# Patient Record
Sex: FEMALE | Race: WHITE | Employment: FULL TIME | URBAN - METROPOLITAN AREA
[De-identification: names, ages, dates, MRNs, and addresses within clinical notes are randomized per-mention and may not be internally consistent; named-entity substitution may affect disease eponyms.]

---

## 2018-01-18 ENCOUNTER — OFFICE VISIT (OUTPATIENT)
Dept: DERMATOLOGY | Facility: CLINIC | Age: 33
End: 2018-01-18
Payer: COMMERCIAL

## 2018-01-18 DIAGNOSIS — B07.8 COMMON WART: Primary | ICD-10-CM

## 2018-01-18 RX ORDER — BUPROPION HYDROCHLORIDE 150 MG/1
150 TABLET ORAL
COMMUNITY
Start: 2015-07-28

## 2018-01-18 RX ORDER — FLUOROURACIL 50 MG/G
CREAM TOPICAL AT BEDTIME
Qty: 40 G | Refills: 1 | Status: SHIPPED | OUTPATIENT
Start: 2018-01-18

## 2018-01-18 RX ORDER — CETIRIZINE HYDROCHLORIDE 10 MG/1
10 TABLET ORAL
COMMUNITY
Start: 2015-05-18

## 2018-01-18 ASSESSMENT — PAIN SCALES - GENERAL
PAINLEVEL: NO PAIN (1)
PAINLEVEL: NO PAIN (0)

## 2018-01-18 NOTE — NURSING NOTE
Dermatology Rooming Note    Lisa Fischer's goals for this visit include:   Chief Complaint   Patient presents with     Wart     Lisa is here today for a wart removal- on left ring finger, notes spreading.      Alize Herrera MA

## 2018-01-18 NOTE — PROGRESS NOTES
"[facility] Dermatology Note    Dermatology Problem List:  1. Verrucous vulgaris  - s/p cryo 1/18/18  - Efudex 5% cream    CC:   Chief Complaint   Patient presents with     Wart     Lisa is here today for a wart removal- on left ring finger, notes spreading.      Date of Service: Jan 18, 2018    History of Present Illness:  Ms. Lisa Fischer is a 32 year old female who presents for an evaluation of a wart as a new patient. Today the patient reports that she has a wart on her left ring finger. She states that the wart has been there for 1 year and it has spread to other areas. She notes that she has used a \"frozen pen\" application four times and also has used over the counter salicylic acid. Sometimes it has bled as well. Overall she is feeling well. The patient reports no other lesions of concern at this time.    Otherwise, the patient reports no painful, bleeding, nonhealing, or pruritic lesions, and denies new or changing moles.    Past Medical History:   There is no problem list on file for this patient.    History reviewed. No pertinent past medical history.  History reviewed. No pertinent surgical history.     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    Family History:  No family history of skin cancer, eczema, or psoriasis.    Medications:  Current Outpatient Prescriptions   Medication Sig Dispense Refill     cetirizine (ZYRTEC) 10 MG tablet Take 10 mg by mouth       buPROPion (WELLBUTRIN XL) 150 MG 24 hr tablet Take 150 mg by mouth       levonorgestrel (MIRENA) 20 MCG/24HR IUD 1 each by Intrauterine route       Allergies:  Allergies   Allergen Reactions     Dust Mite Extract      Other reaction(s): Other, see comments  Nasal congestion     Pollen Extract      Other reaction(s): RHINITIS  Other reaction(s): Other, see comments  Rhinitis "     Review of Systems:  - Skin: As above in HPI. No additional skin concerns.    Physical exam:  Vitals: There were no vitals taken for this visit.  GEN: This is a well developed, well-nourished female in no acute distress, in a pleasant mood.      SKIN: Focused examination of the bilateral hands was performed.  - 4 mm verrucous papule on the tip of left 4th digit   - 1 mm verrucous papule adjacent to previous lesion  - No other lesions of concern on areas examined.     Impression/Plan:  1. Verrucous vulgaris  - Discussion of treatment options including cryotherapy and topical medications.  - Start Efudex 5% cream - apply to affected areas daily. Discussion to avoid exposure to animals. Advised to wait until pain resolves before applying cream.  - Cryotherapy procedure note: After verbal consent and discussion of risks and benefits including but no limited to dyspigmentation/scar, blister, and pain, 2 was(were) treated with 1-2mm freeze border for 2 cycles with liquid nitrogen. Post cryotherapy instructions were provided.     Follow-up in 4 weeks, earlier for new or changing lesions.    Staff Involved:  Staff Only    Scribe Disclosure:   I, Paola Garcia, am serving as a scribe to document services personally performed by Luly eBtts PA-C, based on data collection and the provider's statements to me.    Provider Disclosure:   The documentation recorded by the scribe accurately reflects the services I personally performed and the decisions made by me.    All risks, benefits and alternatives were discussed with patient.  Patient is in agreement and understands the assessment and plan.  All questions were answered.  Sun Screen Education was given.   Return to Clinic in 1 month or sooner as needed.   Luly Betts PA-C   [facility]

## 2018-01-18 NOTE — LETTER
"1/18/2018     RE: Lisa Fischer  [address]  [place] [address]     Dear Colleague,    Thank you for referring your patient, Lisa Fischer, to the [facility] DERMATOLOGY at [facility]. Please see a copy of my visit note below.    [facility] Dermatology Note    Dermatology Problem List:  1. Verrucous vulgaris  - s/p cryo 1/18/18  - Efudex 5% cream    CC:   Chief Complaint   Patient presents with     Wart     Lisa is here today for a wart removal- on left ring finger, notes spreading.      Date of Service: Jan 18, 2018    History of Present Illness:  Ms. Lisa Fischer is a 32 year old female who presents for an evaluation of a wart as a new patient. Today the patient reports that she has a wart on her left ring finger. She states that the wart has been there for 1 year and it has spread to other areas. She notes that she has used a \"frozen pen\" application four times and also has used over the counter salicylic acid. Sometimes it has bled as well. Overall she is feeling well. The patient reports no other lesions of concern at this time.    Otherwise, the patient reports no painful, bleeding, nonhealing, or pruritic lesions, and denies new or changing moles.    Past Medical History:   There is no problem list on file for this patient.    History reviewed. No pertinent past medical history.  History reviewed. No pertinent surgical history.     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    Family History:  No family history of skin cancer, eczema, or psoriasis.    Medications:  Current Outpatient Prescriptions   Medication Sig Dispense Refill     cetirizine (ZYRTEC) 10 MG tablet Take 10 mg by mouth       buPROPion (WELLBUTRIN XL) 150 MG 24 hr tablet Take 150 mg by mouth       levonorgestrel (MIRENA) 20 " MCG/24HR IUD 1 each by Intrauterine route       Allergies:  Allergies   Allergen Reactions     Dust Mite Extract      Other reaction(s): Other, see comments  Nasal congestion     Pollen Extract      Other reaction(s): RHINITIS  Other reaction(s): Other, see comments  Rhinitis     Review of Systems:  - Skin: As above in HPI. No additional skin concerns.    Physical exam:  Vitals: There were no vitals taken for this visit.  GEN: This is a well developed, well-nourished female in no acute distress, in a pleasant mood.      SKIN: Focused examination of the bilateral hands was performed.  - 4 mm verrucous papule on the tip of left 4th digit   - 1 mm verrucous papule adjacent to previous lesion  - No other lesions of concern on areas examined.     Impression/Plan:  1. Verrucous vulgaris  - Discussion of treatment options including cryotherapy and topical medications.  - Start Efudex 5% cream - apply to affected areas daily. Discussion to avoid exposure to animals. Advised to wait until pain resolves before applying cream.  - Cryotherapy procedure note: After verbal consent and discussion of risks and benefits including but no limited to dyspigmentation/scar, blister, and pain, 2 was(were) treated with 1-2mm freeze border for 2 cycles with liquid nitrogen. Post cryotherapy instructions were provided.     Follow-up in 4 weeks, earlier for new or changing lesions.    Staff Involved:  Staff Only    Scribe Disclosure:   I, Paola Garcia, am serving as a scribe to document services personally performed by Luly Betts PA-C, based on data collection and the provider's statements to me.    Provider Disclosure:   The documentation recorded by the scribe accurately reflects the services I personally performed and the decisions made by me.    All risks, benefits and alternatives were discussed with patient.  Patient is in agreement and understands the assessment and plan.  All questions were answered.  Sun Screen Education was  given.   Return to Clinic in 1 month or sooner as needed.   Luly Betts PA-C   [facility]erm[facility]

## 2018-01-18 NOTE — MR AVS SNAPSHOT
After Visit Summary   1/18/2018    Lisa Fischer    MRN: [id number]           Patient Information     Date Of Birth          1985        Visit Information        Provider Department      1/18/2018 12:30 PM Luly Betts PA-C [facility] Dermatology        Today's Diagnoses     Common wart    -  1      Care Instructions    Cryotherapy    What is it?    Use of a very cold liquid, such as liquid nitrogen, to freeze and destroy abnormal skin cells that need to be removed    What should I expect?    Tenderness and redness    A small blister that might grow and fill with dark purple blood. There may be crusting.    More than one treatment may be needed if the lesions do not go away.    How do I care for the treated area?    Gently wash the area with your hands when bathing.    Use a thin layer of Vaseline to help with healing. You may use a Band-Aid.     The area should heal within 7-10 days and may leave behind a pink or lighter color.     Do not use an antibiotic or Neosporin ointment.     You may take acetaminophen (Tylenol) for pain.     Call your Doctor if you have:    Severe pain    Signs of infection (warmth, redness, cloudy yellow drainage, and or a bad smell)    Questions or concerns    Who should I call with questions?       [facility]: [phone]       [facility]: [phone]       For urgent needs outside of business hours call the [facility] at [phone]        and ask for the dermatology resident on call          Follow-ups after your visit        Follow-up notes from your care team     Return in about 4 weeks (around 2/15/2018).      Your next 10 appointments already scheduled     Feb 26, 2018  8:45 AM CST   (Arrive by 8:30 AM)   Return Visit with SHELL Winslow [facility] Dermatology ([facility])    [address] [place] [id number]    [phone]              Who to contact     Please call your clinic at [phone] to:    Ask questions about your health    Make or cancel appointments    Discuss your medicines    Learn about your test results    Speak to your doctor   If you have compliments or concerns about an experience at your clinic, or if you wish to file a complaint, please contact [facility] Patient Relations at [phone] or email us at [email or web address]         Additional Information About Your Visit        HipvanharGlad to Have You Information     Pockee is an electronic gateway that provides easy, online access to your medical records. With Pockee, you can request a clinic appointment, read your test results, renew a prescription or communicate with your care team.     To sign up for Pockee visit the website at [email or web address]   You will be asked to enter the access code listed below, as well as some personal information. Please follow the directions to create your username and password.     Your access code is: [email or web address]  Expires: 2018  9:00 AM     Your access code will  in 90 days. If you need help or a new code, please contact your [facility] Clinic or call [phone] for assistance.        Care EveryWhere ID     This is your Care EveryWhere ID. This could be used by other organizations to access your [place] medical records  [id number]         Blood Pressure from Last 3 Encounters:   No data found for BP    Weight from Last 3 Encounters:   No data found for Wt              Today, you had the following     No orders found for display         Today's Medication Changes          These changes are accurate as of: 18  1:07 PM.  If you have any questions, ask your nurse or doctor.               Start taking these medicines.        Dose/Directions    fluorouracil 5 % cream   Commonly known as:  EFUDEX   Used for:  Common wart   Started by:   Luly Betts PA-C        Apply topically At Bedtime To the wart   Quantity:  40 g   Refills:  1            Where to get your medicines      These medications were sent to [place], [place] - [address]  [address], [facility] [address]    Hours:  TRANSPLANT PHONE NUMBER [phone] Phone:  [phone]     fluorouracil 5 % cream                Primary Care Provider    None Specified       No primary provider on file.        Equal Access to Services     LAURA PRITCHARD : Hadii aad ku hadasho Soomaali, waaxda luqadaha, qaybta kaalmada adeegyada, waxay idiin haylillin gordon levin . So [facility] [phone].    ATENCIÓN: Si habla español, tiene a otero disposición servicios gratuitos de asistencia lingüística. Llame al [phone].    We comply with applicable federal civil rights laws and [place] laws. We do not discriminate on the basis of race, color, national origin, age, disability, sex, sexual orientation, or gender identity.            Thank you!     Thank you for choosing [facility] DERMATOLOGY  for your care. Our goal is always to provide you with excellent care. Hearing back from our patients is one way we can continue to improve our services. Please take a few minutes to complete the written survey that you may receive in the mail after your visit with us. Thank you!             Your Updated Medication List - Protect others around you: Learn how to safely use, store and throw away your medicines at www.disposemymeds.org.          This list is accurate as of: 1/18/18  1:07 PM.  Always use your most recent med list.                   Brand Name Dispense Instructions for use Diagnosis    buPROPion 150 MG 24 hr tablet    WELLBUTRIN XL     Take 150 mg by mouth        cetirizine 10 MG tablet    zyrTEC     Take 10 mg by mouth        fluorouracil 5 % cream    EFUDEX    40 g    Apply topically At Bedtime To the wart    Common wart        levonorgestrel 20 MCG/24HR IUD    MIRENA     1 each by Intrauterine route

## 2019-01-29 ENCOUNTER — OFFICE VISIT (OUTPATIENT)
Dept: DERMATOLOGY | Facility: CLINIC | Age: 34
End: 2019-01-29
Payer: COMMERCIAL

## 2019-01-29 DIAGNOSIS — D48.5 NEOPLASM OF UNCERTAIN BEHAVIOR OF SKIN: ICD-10-CM

## 2019-01-29 DIAGNOSIS — B07.9 VIRAL WARTS, UNSPECIFIED TYPE: Primary | ICD-10-CM

## 2019-01-29 RX ORDER — CANDIDA ALBICANS 1000 [PNU]/ML
0.1 INJECTION, SOLUTION INTRADERMAL ONCE
Status: COMPLETED | OUTPATIENT
Start: 2019-01-29 | End: 2019-01-29

## 2019-01-29 RX ADMIN — CANDIDA ALBICANS 0.1 ML: 1000 INJECTION, SOLUTION INTRADERMAL at 16:47

## 2019-01-29 ASSESSMENT — PAIN SCALES - GENERAL
PAINLEVEL: NO PAIN (0)
PAINLEVEL: MILD PAIN (2)

## 2019-01-29 NOTE — NURSING NOTE
Lidocaine-epinephrine 1-1:232668 % injection   .75mL once for one use, starting 1/29/2019 ending 1/29/2019,  2mL disp, R-0, injection  Injected by Sammie Underwood

## 2019-01-29 NOTE — NURSING NOTE
Dermatology Rooming Note    Lisa Fischer's goals for this visit include:   Chief Complaint   Patient presents with     Derm Problem     Lisa is being seen today for a wart from previous and 2 on face, 1 on left hand ring finger, right hand pointer finger, possibly Left foot as reported by pt.     Neli Mckee, NATASHA

## 2019-01-29 NOTE — LETTER
Date:January 30, 2019      Patient was self referred, no letter generated. Do not send.        [facility] Health Information

## 2019-01-29 NOTE — PROGRESS NOTES
[facility] Dermatology Note    Dermatology Problem List:  1. Verrucous vulgaris- otc salicylic acid   - s/p candida 1/29/19  - s/p cryo 1/18/18, 1/29/19  - s/p Efudex 5% cream  2. NUB, right lower cheek- s/p bx 1/29/19    CC:   Chief Complaint   Patient presents with     Derm Problem     Lisa is being seen today for a wart from previous and 2 on face, 1 on left hand ring finger, right hand pointer finger, possibly Left foot as reported by pt.     Date of Service: Jan 29, 2019    History of Present Illness:  Ms. Lisa Fischer is a 33 year old female who presents today in follow up for warts. The patient was last seen in the dermatology clinic on 1/18/18 during which she started efudex 5% cream and 2 verruca on her left 4th digit. Today she reports the original wart resolved after diligent use of the Efudex. However, the wart returned along with additional warts, which required clinical treatment. The lesion on her face grew very rapidly. The original wart on her left 4th digit has returned flatter than previously. She notes that many of her nails have dry patches of skin around them. She assumed that her cuticles are very dry due to the weather.     Otherwise, the patient reports no painful, bleeding, nonhealing, or pruritic lesions, and denies new or changing moles.    Past Medical History:   There is no problem list on file for this patient.    No past medical history on file.  No past surgical history on file.     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    Family History:  No family history of skin cancer, eczema, or psoriasis.    Medications:  Current Outpatient Medications   Medication Sig Dispense Refill     cetirizine (ZYRTEC) 10 MG tablet Take 10 mg by mouth       levonorgestrel (MIRENA) 20 MCG/24HR IUD 1 each by Intrauterine route        buPROPion (WELLBUTRIN XL) 150 MG 24 hr tablet Take 150 mg by mouth       fluorouracil (EFUDEX) 5 % cream Apply topically At Bedtime To the wart (Patient not taking: Reported on 1/29/2019) 40 g 1     Allergies:  Allergies   Allergen Reactions     Dust Mite Extract      Other reaction(s): Other, see comments  Nasal congestion     Pollen Extract      Other reaction(s): RHINITIS  Other reaction(s): Other, see comments  Rhinitis     Review of Systems:  - Skin: As above in HPI. No additional skin concerns.  - Constitutional: She is feeling generally well, in her usual state of health    Physical exam:  Vitals: There were no vitals taken for this visit.  GEN: This is a well developed, well-nourished female in no acute distress, in a pleasant mood.    SKIN: Focused examination of the face and bilateral hands and feet was performed.  - Large plaque at 2nd metatarsal area with an adjacent 3 mm wart.    - There are 3 additional warts on the metatarsals  - There are 2 verrucous papules on the palmar surface of the left third digit and  left thumb beena ungually  - 2 periungual warts on the right thumb   - 2 verrucous papules on the right index finger  - There is a verrucous papule on the left nasal ala   - 3 mm slightly scaly exophytic papule on the right lower cheek  - No other lesions of concern on areas examined.     Impression/Plan:  1. Verrucous vulgaris x 9- face, bilateral hands and feet   - Discussion of treatment options including cryotherapy and candida, as previous warts were recalcitrant to topicals  - After cleansing with alcohol, a total of 0.8 cc of candida antigen was injected into suitable lesions on bilateral feet x 2 and hands x 7.  The patient tolerated the procedure well.      - Cryotherapy procedure note: After verbal consent and discussion of risks and benefits including but no limited to dyspigmentation/scar, blister, and pain, 1 wart on her left nasal ala was treated with 1-2mm freeze border for 2 cycles  with liquid nitrogen. Post cryotherapy instructions were provided.   -Start otc salicylic acid treatments. Recommend paring down lesion prior to application to increase effectiveness of medication.     2. Neoplasm of uncertain behavior to the right lower cheek. Differential diagnosis to include: vv vs scc vs other   -Shave biopsy: After discussion of benefits and risks including but not limited to bleeding, infection, scar, incomplete removal, recurrence, and non-diagnostic biopsy, written consent and photographs were obtained. The area was cleaned with isopropyl alcohol. 0.5 mL of 1% lidocaine with epinephrine was injected to obtain adequate anesthesia of the lesion on the right lower cheek. A shave biopsy was performed. Hemostasis was achieved with aluminium chloride. Vaseline and a sterile dressing were applied. The patient tolerated the procedure and no complications were noted. The patient was provided with verbal and written post care instructions.     Follow-up in 6 weeks, earlier for new or changing lesions.    Staff Involved:  Scribe/Staff    Scribe Disclosure:   Cindy DUKES, am serving as a scribe to document services personally performed by Luly Betts PA-C, based on data collection and the provider's statements to me.    Provider Disclosure:   The documentation recorded by the scribe accurately reflects the services I personally performed and the decisions made by me.    All risks, benefits and alternatives were discussed with patient.  Patient is in agreement and understands the assessment and plan.  All questions were answered.  Sun Screen Education was given.   Return to Clinic in 6 weeks or sooner as needed.   Luly Betts PA-C   [facility] Dermatology Clinic

## 2019-01-29 NOTE — LETTER
1/29/2019       RE: Lisa Fischer  [address]  [place] [address]     Dear Colleague,    Thank you for referring your patient, Lisa Fischer, to the [facility] DERMATOLOGY at [facility]. Please see a copy of my visit note below.    [facility] Dermatology Note    Dermatology Problem List:  1. Verrucous vulgaris- otc salicylic acid   - s/p candida 1/29/19  - s/p cryo 1/18/18, 1/29/19  - s/p Efudex 5% cream  2. NUB, right lower cheek- s/p bx 1/29/19    CC:   Chief Complaint   Patient presents with     Derm Problem     Lisa is being seen today for a wart from previous and 2 on face, 1 on left hand ring finger, right hand pointer finger, possibly Left foot as reported by pt.     Date of Service: Jan 29, 2019    History of Present Illness:  Ms. Lisa Fischer is a 33 year old female who presents today in follow up for warts. The patient was last seen in the dermatology clinic on 1/18/18 during which she started efudex 5% cream and 2 verruca on her left 4th digit. Today she reports the original wart resolved after diligent use of the Efudex. However, the wart returned along with additional warts, which required clinical treatment. The lesion on her face grew very rapidly. The original wart on her left 4th digit has returned flatter than previously. She notes that many of her nails have dry patches of skin around them. She assumed that her cuticles are very dry due to the weather.     Otherwise, the patient reports no painful, bleeding, nonhealing, or pruritic lesions, and denies new or changing moles.    Past Medical History:   There is no problem list on file for this patient.    No past medical history on file.  No past surgical history on file.     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    Family  History:  No family history of skin cancer, eczema, or psoriasis.    Medications:  Current Outpatient Medications   Medication Sig Dispense Refill     cetirizine (ZYRTEC) 10 MG tablet Take 10 mg by mouth       levonorgestrel (MIRENA) 20 MCG/24HR IUD 1 each by Intrauterine route       buPROPion (WELLBUTRIN XL) 150 MG 24 hr tablet Take 150 mg by mouth       fluorouracil (EFUDEX) 5 % cream Apply topically At Bedtime To the wart (Patient not taking: Reported on 1/29/2019) 40 g 1     Allergies:  Allergies   Allergen Reactions     Dust Mite Extract      Other reaction(s): Other, see comments  Nasal congestion     Pollen Extract      Other reaction(s): RHINITIS  Other reaction(s): Other, see comments  Rhinitis     Review of Systems:  - Skin: As above in HPI. No additional skin concerns.  - Constitutional: She is feeling generally well, in her usual state of health    Physical exam:  Vitals: There were no vitals taken for this visit.  GEN: This is a well developed, well-nourished female in no acute distress, in a pleasant mood.    SKIN: Focused examination of the face and bilateral hands and feet was performed.  - Large plaque at 2nd metatarsal area with an adjacent 3 mm wart.    - There are 3 additional warts on the metatarsals  - There are 2 verrucous papules on the palmar surface of the left third digit and  left thumb beena ungually  - 2 periungual warts on the right thumb   - 2 verrucous papules on the right index finger  - There is a verrucous papule on the left nasal ala   - 3 mm slightly scaly exophytic papule on the right lower cheek  - No other lesions of concern on areas examined.     Impression/Plan:  1. Verrucous vulgaris x 9- face, bilateral hands and feet   - Discussion of treatment options including cryotherapy and candida, as previous warts were recalcitrant to topicals  - After cleansing with alcohol, a total of 0.8 cc of candida antigen was injected into suitable lesions on bilateral feet x 2 and hands x  7.  The patient tolerated the procedure well.      - Cryotherapy procedure note: After verbal consent and discussion of risks and benefits including but no limited to dyspigmentation/scar, blister, and pain, 1 wart on her left nasal ala was treated with 1-2mm freeze border for 2 cycles with liquid nitrogen. Post cryotherapy instructions were provided.   -Start otc salicylic acid treatments. Recommend paring down lesion prior to application to increase effectiveness of medication.     2. Neoplasm of uncertain behavior to the right lower cheek. Differential diagnosis to include: vv vs scc vs other   -Shave biopsy: After discussion of benefits and risks including but not limited to bleeding, infection, scar, incomplete removal, recurrence, and non-diagnostic biopsy, written consent and photographs were obtained. The area was cleaned with isopropyl alcohol. 0.5 mL of 1% lidocaine with epinephrine was injected to obtain adequate anesthesia of the lesion on the right lower cheek. A shave biopsy was performed. Hemostasis was achieved with aluminium chloride. Vaseline and a sterile dressing were applied. The patient tolerated the procedure and no complications were noted. The patient was provided with verbal and written post care instructions.     Follow-up in 6 weeks, earlier for new or changing lesions.    Staff Involved:  Scribe/Staff    Scribe Disclosure:   ERNST, Cindy Mcnulty, am serving as a scribe to document services personally performed by Luly Betts PA-C, based on data collection and the provider's statements to me.    Provider Disclosure:   The documentation recorded by the scribe accurately reflects the services I personally performed and the decisions made by me.    All risks, benefits and alternatives were discussed with patient.  Patient is in agreement and understands the assessment and plan.  All questions were answered.  Sun Screen Education was given.   Return to Clinic in 6 weeks or sooner as needed.    Luly Betts PA-C   [facility]     Again, thank you for allowing me to participate in the care of your patient.      Sincerely,    Luly Betts PA-C

## 2019-01-29 NOTE — PATIENT INSTRUCTIONS
Wound Care After a Biopsy    What is a skin biopsy?  A skin biopsy allows the doctor to examine a very small piece of tissue under the microscope to determine the diagnosis and the best treatment for the skin condition. A local anesthetic (numbing medicine)  is injected with a very small needle into the skin area to be tested. A small piece of skin is taken from the area. Sometimes a suture (stitch) is used.     What are the risks of a skin biopsy?  I will experience scar, bleeding, swelling, pain, crusting and redness. I may experience incomplete removal or recurrence. Risks of this procedure are excessive bleeding, bruising, infection, nerve damage, numbness, thick (hypertrophic or keloidal) scar and non-diagnostic biopsy.    How should I care for my wound for the first 24 hours?    Keep the wound dry and covered for 24 hours    If it bleeds, hold direct pressure on the area for 15 minutes. If bleeding does not stop then go to the emergency room    Avoid strenuous exercise the first 1-2 days or as your doctor instructs you    How should I care for the wound after 24 hours?    After 24 hours, remove the bandage    You may bathe or shower as normal    If you had a scalp biopsy, you can shampoo as usual and can use shower water to clean the biopsy site daily    Clean the wound twice a day with gentle soap and water    Do not scrub, be gentle    Apply white petroleum/Vaseline after cleaning the wound with a cotton swab or a clean finger, and keep the site covered with a Bandaid /bandage. Bandages are not necessary with a scalp biopsy    If you are unable to cover the site with a Bandaid /bandage, re-apply ointment 2-3 times a day to keep the site moist. Moisture will help with healing    Avoid strenuous activity for first 1-2 days    Avoid lakes, rivers, pools, and oceans until the stitches are removed or the site is healed    How do I clean my wound?    Wash hands thoroughly with soap or use hand  before all  wound care    Clean the wound with gentle soap and water    Apply white petroleum/Vaseline  to wound after it is clean    Replace the Bandaid /bandage to keep the wound covered for the first few days or as instructed by your doctor    If you had a scalp biopsy, warm shower water to the area on a daily basis should suffice    What should I use to clean my wound?     Cotton-tipped applicators (Qtips )    White petroleum jelly (Vaseline ). Use a clean new container and use Q-tips to apply.    Bandaids   as needed    Gentle soap     How should I care for my wound long term?    Do not get your wound dirty    Keep up with wound care for one week or until the area is healed.    A small scab will form and fall off by itself when the area is completely healed. The area will be red and will become pink in color as it heals. Sun protection is very important for how your scar will turn out. Sunscreen with an SPF 30 or greater is recommended once the area is healed.    You should have some soreness but it should be mild and slowly go away over several days. Talk to your doctor about using tylenol for pain,    When should I call my doctor?  If you have increased:     Pain or swelling    Pus or drainage (clear or slightly yellow drainage is ok)    Temperature over 100F    Spreading redness or warmth around wound    When will I hear about my results?  The biopsy results can take 2-3 weeks to come back. The clinic will call you with the results, send you a "[employer]"t message, or have you schedule a follow-up clinic or phone time to discuss the results. Contact our clinics if you do not hear from us in 3 weeks.     Who should I call with questions?    [facility]: [phone]     [facility]: [phone]    For urgent needs outside of business hours call the [facility] at [phone] and ask for the dermatology resident on call      Cryotherapy    What is it?    Use  of a very cold liquid, such as liquid nitrogen, to freeze and destroy abnormal skin cells that need to be removed    What should I expect?    Tenderness and redness    A small blister that might grow and fill with dark purple blood. There may be crusting.    More than one treatment may be needed if the lesions do not go away.    How do I care for the treated area?    Gently wash the area with your hands when bathing.    Use a thin layer of Vaseline to help with healing. You may use a Band-Aid.     The area should heal within 7-10 days and may leave behind a pink or lighter color.     Do not use an antibiotic or Neosporin ointment.     You may take acetaminophen (Tylenol) for pain.     Call your Doctor if you have:    Severe pain    Signs of infection (warmth, redness, cloudy yellow drainage, and or a bad smell)    Questions or concerns    Who should I call with questions?       [facility]: [phone]       [facility]: [phone]       For urgent needs outside of business hours call the [facility] at [phone]        and ask for the dermatology resident on call

## 2019-02-01 LAB — COPATH REPORT: NORMAL

## 2019-02-15 ENCOUNTER — HEALTH MAINTENANCE LETTER (OUTPATIENT)
Age: 34
End: 2019-02-15

## 2019-03-12 ENCOUNTER — OFFICE VISIT (OUTPATIENT)
Dept: DERMATOLOGY | Facility: CLINIC | Age: 34
End: 2019-03-12
Payer: COMMERCIAL

## 2019-03-12 ENCOUNTER — DOCUMENTATION ONLY (OUTPATIENT)
Dept: CARE COORDINATION | Facility: CLINIC | Age: 34
End: 2019-03-12

## 2019-03-12 DIAGNOSIS — L73.8 SENILE SEBACEOUS GLAND HYPERPLASIA: ICD-10-CM

## 2019-03-12 DIAGNOSIS — B07.8 COMMON WART: Primary | ICD-10-CM

## 2019-03-12 RX ORDER — CANDIDA ALBICANS 1000 [PNU]/ML
0.5 INJECTION, SOLUTION INTRADERMAL ONCE
Status: COMPLETED | OUTPATIENT
Start: 2019-03-12 | End: 2019-03-12

## 2019-03-12 RX ADMIN — CANDIDA ALBICANS 0.5 ML: 1000 INJECTION, SOLUTION INTRADERMAL at 16:10

## 2019-03-12 ASSESSMENT — PAIN SCALES - GENERAL: PAINLEVEL: NO PAIN (0)

## 2019-03-12 NOTE — LETTER
"3/12/2019       RE: Lisa Fischer  [address]  [facility] [place] [address]     Dear Colleague,    Thank you for referring your patient, Lisa Fischer, to the [facility] DERMATOLOGY at [facility]. Please see a copy of my visit note below.    [facility] Dermatology Note    Dermatology Problem List:  1. Verrucous vulgaris- otc salicylic acid   - s/p candida 1/29/19, 3/12/19  - s/p cryo 1/18/18, 1/29/19  - s/p Efudex 5% cream    CC:   Chief Complaint   Patient presents with     Wart     Lisa is here today to be seen for Warts. Patient states \"I have seen an improvement, but still present.\"     Date of Service: Mar 12, 2019    History of Present Illness:  Ms. Lisa Fischer is a 33 year old female who presents today in follow up for warts. The patient was last seen in the dermatology clinic on 01/29/19 during which 0.8 ml of candida was injected into 9 lesions on her bilateral feet and hands and 1 wart on her left nasal ala was treated with cryotherapy and 1 NUB on her right lower cheek was biopsied. This biopsy returned consistent with an inflamed verruca vulgaris.     Today she reports she has seen improvement in the warts on her feet and hands, but they are still present. As for the warts on her face, these have resolved. She has not used the otc salicylic acid treatments for the last few days, so the lesions could heal for today's treatments. She also reports a new lesion on her right eyebrow. Originally she thought this was a pimple, but this has persistent- making her think this lesion is a wart.     Otherwise, the patient reports no painful, bleeding, nonhealing, or pruritic lesions, and denies new or changing moles.    Past Medical History:   There is no problem list on file for this patient.    No past medical history on file.  No past surgical history on file.     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    Family History:  No family history of skin cancer, eczema, or psoriasis.    Medications:  Current Outpatient Medications   Medication Sig Dispense Refill     cetirizine (ZYRTEC) 10 MG tablet Take 10 mg by mouth       levonorgestrel (MIRENA) 20 MCG/24HR IUD 1 each by Intrauterine route       buPROPion (WELLBUTRIN XL) 150 MG 24 hr tablet Take 150 mg by mouth       fluorouracil (EFUDEX) 5 % cream Apply topically At Bedtime To the wart (Patient not taking: Reported on 1/29/2019) 40 g 1     Allergies:  Allergies   Allergen Reactions     Dust Mite Extract      Other reaction(s): Other, see comments  Nasal congestion     Pollen Extract      Other reaction(s): RHINITIS  Other reaction(s): Other, see comments  Rhinitis     Review of Systems:  - Skin: As above in HPI. No additional skin concerns.  - Constitutional: She is feeling generally well, in her usual state of health    Physical exam:  Vitals: There were no vitals taken for this visit.  GEN: This is a well developed, well-nourished female in no acute distress, in a pleasant mood.    SKIN: Focused examination of the face and bilateral hands and feet was performed.  - Scattered yellow oily papules with central umbilication on the right upper forehead   - On the left foot, there are 4 verrucous papules at the right 2nd metatarsal, thinner than previously  - There are 2 verrucous papules on the palmar surface of the left fourth digit and  left thumb beena ungually, thinner than previously  - 2 periungual warts on the right thumb, significantly thinner than previously   - 2 verrucous papules on the right index finger  - No verrucous papule on the left nasal ala   - No other lesions of concern on areas examined.     Impression/Plan:  1. Verrucous vulgaris x 9- face, bilateral hands and feet   - Discussion of treatment options including cryotherapy and candida, as previous  warts were recalcitrant to topicals  - After cleansing with alcohol, a total of 0.5 cc of candida antigen was injected into 5 suitable lesions on bilateral hands and feet.  The patient tolerated the procedure well.      -Continue otc salicylic acid treatments. Recommend paring down lesion prior to application to increase effectiveness of medication.     2. Sebaceous hyperplasia, right upper forehead  - Discussed etiology. Reassurance given.     Follow-up in 6 weeks, earlier for new or changing lesions.    Staff Involved:  Scribe/Staff    Scribe Disclosure:   I, Cindy Mcnulty, am serving as a scribe to document services personally performed by Luly Betts PA-C, based on data collection and the provider's statements to me.    Provider Disclosure:   The documentation recorded by the scribe accurately reflects the services I personally performed and the decisions made by me.    All risks, benefits and alternatives were discussed with patient.  Patient is in agreement and understands the assessment and plan.  All questions were answered.  Sun Screen Education was given.   Return to Clinic in 6 weeks or sooner as needed.   Luly Betts PA-C   [facility]     Again, thank you for allowing me to participate in the care of your patient.      Sincerely,    Luly Betts PA-C

## 2019-03-12 NOTE — PROGRESS NOTES
"[facility] Dermatology Note    Dermatology Problem List:  1. Verrucous vulgaris- otc salicylic acid   - s/p candida 1/29/19, 3/12/19  - s/p cryo 1/18/18, 1/29/19  - s/p Efudex 5% cream    CC:   Chief Complaint   Patient presents with     Wart     Lisa is here today to be seen for Warts. Patient states \"I have seen an improvement, but still present.\"     Date of Service: Mar 12, 2019    History of Present Illness:  Ms. Lisa Fischer is a 33 year old female who presents today in follow up for warts. The patient was last seen in the dermatology clinic on 01/29/19 during which 0.8 ml of candida was injected into 9 lesions on her bilateral feet and hands and 1 wart on her left nasal ala was treated with cryotherapy and 1 NUB on her right lower cheek was biopsied. This biopsy returned consistent with an inflamed verruca vulgaris.     Today she reports she has seen improvement in the warts on her feet and hands, but they are still present. As for the warts on her face, these have resolved. She has not used the otc salicylic acid treatments for the last few days, so the lesions could heal for today's treatments. She also reports a new lesion on her right eyebrow. Originally she thought this was a pimple, but this has persistent- making her think this lesion is a wart.     Otherwise, the patient reports no painful, bleeding, nonhealing, or pruritic lesions, and denies new or changing moles.    Past Medical History:   There is no problem list on file for this patient.    No past medical history on file.  No past surgical history on file.     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    Family History:  No family history of skin cancer, eczema, or psoriasis.    Medications:  Current Outpatient Medications   Medication Sig Dispense Refill     cetirizine " (ZYRTEC) 10 MG tablet Take 10 mg by mouth       levonorgestrel (MIRENA) 20 MCG/24HR IUD 1 each by Intrauterine route       buPROPion (WELLBUTRIN XL) 150 MG 24 hr tablet Take 150 mg by mouth       fluorouracil (EFUDEX) 5 % cream Apply topically At Bedtime To the wart (Patient not taking: Reported on 1/29/2019) 40 g 1     Allergies:  Allergies   Allergen Reactions     Dust Mite Extract      Other reaction(s): Other, see comments  Nasal congestion     Pollen Extract      Other reaction(s): RHINITIS  Other reaction(s): Other, see comments  Rhinitis     Review of Systems:  - Skin: As above in HPI. No additional skin concerns.  - Constitutional: She is feeling generally well, in her usual state of health    Physical exam:  Vitals: There were no vitals taken for this visit.  GEN: This is a well developed, well-nourished female in no acute distress, in a pleasant mood.    SKIN: Focused examination of the face and bilateral hands and feet was performed.  - Scattered yellow oily papules with central umbilication on the right upper forehead   - On the left foot, there are 4 verrucous papules at the right 2nd metatarsal, thinner than previously  - There are 2 verrucous papules on the palmar surface of the left fourth digit and  left thumb beena ungually, thinner than previously  - 2 periungual warts on the right thumb, significantly thinner than previously   - 2 verrucous papules on the right index finger  - No verrucous papule on the left nasal ala   - No other lesions of concern on areas examined.     Impression/Plan:  1. Verrucous vulgaris x 9- face, bilateral hands and feet   - Discussion of treatment options including cryotherapy and candida, as previous warts were recalcitrant to topicals  - After cleansing with alcohol, a total of 0.5 cc of candida antigen was injected into 5 suitable lesions on bilateral hands and feet.  The patient tolerated the procedure well.      -Continue otc salicylic acid treatments. Recommend  paring down lesion prior to application to increase effectiveness of medication.     2. Sebaceous hyperplasia, right upper forehead  - Discussed etiology. Reassurance given.     Follow-up in 6 weeks, earlier for new or changing lesions.    Staff Involved:  Scribe/Staff    Scribe Disclosure:   I, Cindy Mcnulty, am serving as a scribe to document services personally performed by Luly Betts PA-C, based on data collection and the provider's statements to me.    Provider Disclosure:   The documentation recorded by the scribe accurately reflects the services I personally performed and the decisions made by me.    All risks, benefits and alternatives were discussed with patient.  Patient is in agreement and understands the assessment and plan.  All questions were answered.  Sun Screen Education was given.   Return to Clinic in 6 weeks or sooner as needed.   Luly Betts PA-C   [facility]

## 2019-03-12 NOTE — LETTER
Date:March 14, 2019      Patient was self referred, no letter generated. Do not send.        [facility] Health Information

## 2019-03-12 NOTE — NURSING NOTE
"Chief Complaint   Patient presents with     Wart     Lisa is here today to be seen for Warts. Patient states \"I have seen an improvement, but still present.\"     Perlita Heaton LPN    "

## 2019-03-12 NOTE — NURSING NOTE
Drug Administration Record    Prior to injection, verified patient identity using patient's name and date of birth.  Due to injection administration, patient instructed to remain in clinic for 15 minutes  afterwards, and to report any adverse reaction to me immediately.    Drug Name: triamcinolone acetonide(kenalog)  Dose: .5 mL of candida antigen  Route administered: ID  NDC #: [id number]: Candida ([id number])  Amount of waste(mL):.5  Reason for waste: Multi dose vial     LOT #:   SITE: see provider note  : [employer]  EXPIRATION DATE: [id number]

## 2019-03-22 ENCOUNTER — MYC MEDICAL ADVICE (OUTPATIENT)
Dept: DERMATOLOGY | Facility: CLINIC | Age: 34
End: 2019-03-22

## 2019-03-22 DIAGNOSIS — L70.0 ACNE VULGARIS: Primary | ICD-10-CM

## 2019-03-25 RX ORDER — TRETINOIN 0.4 MG/G
GEL TOPICAL
Qty: 50 G | Refills: 3 | Status: SHIPPED | OUTPATIENT
Start: 2019-03-25

## 2019-05-01 ENCOUNTER — OFFICE VISIT (OUTPATIENT)
Dept: DERMATOLOGY | Facility: CLINIC | Age: 34
End: 2019-05-01
Payer: COMMERCIAL

## 2019-05-01 DIAGNOSIS — B07.8 COMMON WART: Primary | ICD-10-CM

## 2019-05-01 RX ORDER — CANDIDA ALBICANS 1000 [PNU]/ML
0.5 INJECTION, SOLUTION INTRADERMAL ONCE
Status: COMPLETED | OUTPATIENT
Start: 2019-05-01 | End: 2019-05-01

## 2019-05-01 RX ORDER — CIMETIDINE 800 MG
800 TABLET ORAL AT BEDTIME
Qty: 30 TABLET | Refills: 3 | Status: SHIPPED | OUTPATIENT
Start: 2019-05-01

## 2019-05-01 RX ADMIN — CANDIDA ALBICANS 0.5 ML: 1000 INJECTION, SOLUTION INTRADERMAL at 10:12

## 2019-05-01 ASSESSMENT — PAIN SCALES - GENERAL: PAINLEVEL: NO PAIN (0)

## 2019-05-01 NOTE — LETTER
"5/1/2019       RE: Lisa Fischer  [address]  [facility] [address]     Dear Colleague,    Thank you for referring your patient, Lisa Fischer, to the [facility] DERMATOLOGY at [facility]. Please see a copy of my visit note below.    [facility] Dermatology Note    Dermatology Problem List:  1. Verrucous vulgaris- otc salicylic acid   - s/p candida 1/29/19, 3/12/19  - s/p cryo 1/18/18, 1/29/19  - s/p Efudex 5% cream    CC:   Chief Complaint   Patient presents with     Derm Problem     Wart follow up, Lisa states \" My warts are the same.\"      Date of Service: May 1, 2019    History of Present Illness:  Ms. Lisa Fischer is a 33 year old female who presents today in follow up for warts. The patient was last seen in the dermatology clinic on 03/12/19 during which 0.5cc of candida antigen was injected into 5 warts on bilateral hands and feet.     Today she reports her warts are still present on her hands and feet. She is unsure if the warts have changed in texture or if they have gotten flatter, as she has a lot of dry skin on her hands. She has been using otc salicylic acid in between clinical treatments.     Otherwise, the patient reports no painful, bleeding, nonhealing, or pruritic lesions, and denies new or changing moles.    Past Medical History:   There is no problem list on file for this patient.    No past medical history on file.  No past surgical history on file.     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    Family History:  No family history of skin cancer, eczema, or psoriasis.    Medications:  Current Outpatient Medications   Medication Sig Dispense Refill     buPROPion (WELLBUTRIN XL) 150 MG 24 hr tablet Take 150 mg by mouth       cetirizine (ZYRTEC) 10 MG tablet Take 10 mg by mouth       " fluorouracil (EFUDEX) 5 % cream Apply topically At Bedtime To the wart (Patient not taking: Reported on 1/29/2019) 40 g 1     levonorgestrel (MIRENA) 20 MCG/24HR IUD 1 each by Intrauterine route       tretinoin microsphere (RETIN-A MICRO PUMP) 0.04 % external gel Apply a peasized amount to the face at bedtime. 50 g 3     Allergies:  Allergies   Allergen Reactions     Dust Mite Extract      Other reaction(s): Other, see comments  Nasal congestion     Pollen Extract      Other reaction(s): RHINITIS  Other reaction(s): Other, see comments  Rhinitis     Review of Systems:  - Skin: As above in HPI. No additional skin concerns.  - Constitutional: She is feeling generally well, in her usual state of health    Physical exam:  Vitals: There were no vitals taken for this visit.  GEN: This is a well developed, well-nourished female in no acute distress, in a pleasant mood.    SKIN: Focused examination of the face and bilateral hands and feet was performed.  - On the left foot, there are 4 verrucous papules at the right 2nd metatarsal, thinner than previously  - There are 2 verrucous papules on the palmar surface of the left fourth digit and  left thumb beena ungually, thinner than previously  - 2 periungual warts on the right thumb, significantly thinner than previously   - 2 verrucous papules on the right index finger  - No other lesions of concern on areas examined.     Impression/Plan:  1. Verrucous vulgaris x 9, bilateral hands and feet, s/p cryotherapy, candida, efudex 5% cream  - After cleansing with alcohol, a total of 0.5 cc of candida antigen was injected into 5 suitable lesions on bilateral hands and left foot.  The patient tolerated the procedure well.      -Continue otc salicylic acid treatments. Recommend paring down lesion prior to application to increase effectiveness of medication.  - Discussed risks and benefits of cimetidine. Pt is interested this in adding this to her daily regimen. Start cimetidine 800 mg at  bedtime.   - Discussed implementing daily zinc supplement   - Continue daily paring of lesions along with otc salicylic acid treatments       Follow-up in 6 weeks, earlier for new or changing lesions.    Staff Involved:  Scribe/Staff    Scribe Disclosure:   I, Cindy Mcnulty, am serving as a scribe to document services personally performed by Luly Betts PA-C, based on data collection and the provider's statements to me.    Provider Disclosure:   The documentation recorded by the scribe accurately reflects the services I personally performed and the decisions made by me.    All risks, benefits and alternatives were discussed with patient.  Patient is in agreement and understands the assessment and plan.  All questions were answered.  Sun Screen Education was given.   Return to Clinic in 6 weeks or sooner as needed.   Luly Betts PA-C   [facility]     Drug Administration Record    Prior to injection, verified patient identity using patient's name and date of birth.  Due to injection administration, patient instructed to remain in clinic for 15 minutes  afterwards, and to report any adverse reaction to me immediately.    Drug Name: candida antigen  Dose: 0.5mL of candida antigen  Route administered: ID  NDC #: [id number]: Candida (20730-615-32)  Amount of waste(mL):0.5  Reason for waste: Multi dose vial used as single use    LOT #:   SITE: see note  :[employer]  EXPIRATION DATE: 1/23/2021      Again, thank you for allowing me to participate in the care of your patient.      Sincerely,    Luly Betts PA-C

## 2019-05-01 NOTE — NURSING NOTE
"Dermatology Rooming Note    Lisa Fischer's goals for this visit include:   Chief Complaint   Patient presents with     Derm Problem     Wart follow up, Lisa states \" My warts are the same.\"      Sammie Underwood LPN  "

## 2019-05-01 NOTE — LETTER
Date:May 2, 2019      Patient was self referred, no letter generated. Do not send.        [facility] Health Information

## 2019-05-01 NOTE — PROGRESS NOTES
"[facility] Dermatology Note    Dermatology Problem List:  1. Verrucous vulgaris- otc salicylic acid   - s/p candida 1/29/19, 3/12/19  - s/p cryo 1/18/18, 1/29/19  - s/p Efudex 5% cream    CC:   Chief Complaint   Patient presents with     Derm Problem     Wart follow up, Lisa states \" My warts are the same.\"      Date of Service: May 1, 2019    History of Present Illness:  Ms. Lisa Fischer is a 33 year old female who presents today in follow up for warts. The patient was last seen in the dermatology clinic on 03/12/19 during which 0.5cc of candida antigen was injected into 5 warts on bilateral hands and feet.     Today she reports her warts are still present on her hands and feet. She is unsure if the warts have changed in texture or if they have gotten flatter, as she has a lot of dry skin on her hands. She has been using otc salicylic acid in between clinical treatments.     Otherwise, the patient reports no painful, bleeding, nonhealing, or pruritic lesions, and denies new or changing moles.    Past Medical History:   There is no problem list on file for this patient.    No past medical history on file.  No past surgical history on file.     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 She has been working there for 2 years.  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    Family History:  No family history of skin cancer, eczema, or psoriasis.    Medications:  Current Outpatient Medications   Medication Sig Dispense Refill     buPROPion (WELLBUTRIN XL) 150 MG 24 hr tablet Take 150 mg by mouth       cetirizine (ZYRTEC) 10 MG tablet Take 10 mg by mouth       fluorouracil (EFUDEX) 5 % cream Apply topically At Bedtime To the wart (Patient not taking: Reported on 1/29/2019) 40 g 1     levonorgestrel (MIRENA) 20 MCG/24HR IUD 1 each by Intrauterine route       tretinoin microsphere (RETIN-A MICRO PUMP) 0.04 % external gel Apply a peasized " amount to the face at bedtime. 50 g 3     Allergies:  Allergies   Allergen Reactions     Dust Mite Extract      Other reaction(s): Other, see comments  Nasal congestion     Pollen Extract      Other reaction(s): RHINITIS  Other reaction(s): Other, see comments  Rhinitis     Review of Systems:  - Skin: As above in HPI. No additional skin concerns.  - Constitutional: She is feeling generally well, in her usual state of health    Physical exam:  Vitals: There were no vitals taken for this visit.  GEN: This is a well developed, well-nourished female in no acute distress, in a pleasant mood.    SKIN: Focused examination of the face and bilateral hands and feet was performed.  - On the left foot, there are 4 verrucous papules at the right 2nd metatarsal, thinner than previously  - There are 2 verrucous papules on the palmar surface of the left fourth digit and  left thumb beena ungually, thinner than previously  - 2 periungual warts on the right thumb, significantly thinner than previously   - 2 verrucous papules on the right index finger  - No other lesions of concern on areas examined.     Impression/Plan:  1. Verrucous vulgaris x 9, bilateral hands and feet, s/p cryotherapy, candida, efudex 5% cream  - After cleansing with alcohol, a total of 0.5 cc of candida antigen was injected into 5 suitable lesions on bilateral hands and left foot.  The patient tolerated the procedure well.      -Continue otc salicylic acid treatments. Recommend paring down lesion prior to application to increase effectiveness of medication.  - Discussed risks and benefits of cimetidine. Pt is interested this in adding this to her daily regimen. Start cimetidine 800 mg at bedtime.   - Discussed implementing daily zinc supplement   - Continue daily paring of lesions along with otc salicylic acid treatments       Follow-up in 6 weeks, earlier for new or changing lesions.    Staff Involved:  Scribe/Staff    Scribe Disclosure:   Cindy DUKES, am  serving as a scribe to document services personally performed by Luly Betts PA-C, based on data collection and the provider's statements to me.    Provider Disclosure:   The documentation recorded by the scribe accurately reflects the services I personally performed and the decisions made by me.    All risks, benefits and alternatives were discussed with patient.  Patient is in agreement and understands the assessment and plan.  All questions were answered.  Sun Screen Education was given.   Return to Clinic in 6 weeks or sooner as needed.   Luly Betts PA-C   [facility]

## 2019-06-21 ENCOUNTER — OFFICE VISIT (OUTPATIENT)
Dept: DERMATOLOGY | Facility: CLINIC | Age: 34
End: 2019-06-21
Payer: COMMERCIAL

## 2019-06-21 DIAGNOSIS — L81.1 MELASMA: ICD-10-CM

## 2019-06-21 DIAGNOSIS — B07.8 COMMON WART: Primary | ICD-10-CM

## 2019-06-21 ASSESSMENT — PAIN SCALES - GENERAL: PAINLEVEL: NO PAIN (0)

## 2019-06-21 NOTE — NURSING NOTE
Chief Complaint   Patient presents with     RECHECK     Lisa is here today for a 6 week follow up on warts. She states that they got better, but are still present.      Urszula Jin, CMA

## 2019-06-21 NOTE — PROGRESS NOTES
[facility] Dermatology Note    Dermatology Problem List:  1. Verrucous vulgaris- otc salicylic acid   - s/p candida 1/29/19, 3/12/19, 5/1/19  - s/p cryo 1/18/18, 1/29/19  - s/p Efudex 5% cream  2. Acne vulgaris- Retin A micro 0.04% gel  3. Melasma, Retin A micro 0.04% gel and sun protection     CC:   Chief Complaint   Patient presents with     RECHECK     Lisa is here today for a 6 week follow up on warts. She states that they got better, but are still present.      Date of Service: Jun 21, 2019    History of Present Illness:  Ms. Lisa Fischer is a 33 year old female who presents today in follow up for warts. The patient was last seen in the dermatology clinic on 5/1/19 when 0.5 ml of candida antigen was injected into five warts on both hands and left foot. Today she reports that the warts have improved since the last visit, however are still present. She feels like capillaries in the warts appear more visible since starting the cimetidine. She would like to avoid having these injected again. She additionally reports some discoloration on her forehead. The patient is otherwise feeling well. There are no other skin concerns at this time      Past Medical History:   There is no problem list on file for this patient.    History reviewed. No pertinent past medical history.  History reviewed. No pertinent surgical history.     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    Family History:  No family history of skin cancer, eczema, or psoriasis.    Medications:  Current Outpatient Medications   Medication Sig Dispense Refill     buPROPion (WELLBUTRIN XL) 150 MG 24 hr tablet Take 150 mg by mouth       cetirizine (ZYRTEC) 10 MG tablet Take 10 mg by mouth       cimetidine (TAGAMET) 800 MG tablet Take 1 tablet (800 mg) by mouth At Bedtime 30 tablet 3      levonorgestrel (MIRENA) 20 MCG/24HR IUD 1 each by Intrauterine route       tretinoin microsphere (RETIN-A MICRO PUMP) 0.04 % external gel Apply a peasized amount to the face at bedtime. 50 g 3     fluorouracil (EFUDEX) 5 % cream Apply topically At Bedtime To the wart (Patient not taking: Reported on 1/29/2019) 40 g 1     Allergies:  Allergies   Allergen Reactions     Dust Mite Extract      Other reaction(s): Other, see comments  Nasal congestion     Pollen Extract      Other reaction(s): RHINITIS  Other reaction(s): Other, see comments  Rhinitis     Review of Systems:  - Skin: As above in HPI. No additional skin concerns.  - Constitutional: She is feeling generally well, in her usual state of health    Physical exam:  Vitals: There were no vitals taken for this visit.  GEN: This is a well developed, well-nourished female in no acute distress, in a pleasant mood.    SKIN: Focused examination of the face and bilateral hands and feet was performed.  - On the left foot, there are 4 verrucous papules at the right 2nd metatarsal, thinner than previously  - 2 verrucous papules on the palmar surface of the left fourth digit and  left thumb beena ungually, thinner than previously  - 2 periungual warts on the right thumb, significantly thinner than previously   - 2 verrucous papules on the right index finger, significantly thinner than previously.  - Brown reticular symmetric patches on the forehead and bilateral cheeks.   - No other lesions of concern on areas examined.     Impression/Plan:  1. Verrucous vulgaris x 9, bilateral hands and feet, s/p cryotherapy, candida, efudex 5% cream       Continue cimetidine 800 mg at bedtime and zinc supplementation.     Continue daily paring of lesions along with otc salicylic acid treatments.     Pt will consider restarting the Efudex cream as she did not use this diligently in the past.      2. Melasma     Discussed importance of sun protection to avoid darkening.     Reviewed barrier  sunscreens with zinc oxide or titanium dioxide.     Continue tretinoin 0.04% gel - apply to face at bedtime.     Follow-up in 2-3 months, earlier for new or changing lesions.      Staff Involved:  Staff Only    Scribe Disclosure:  I, Nando Jules, am serving as a scribe to document services personally performed by Luly Betts PA-C, based on data collection and the provider's statements to me.     Provider Disclosure:   The documentation recorded by the scribe accurately reflects the services I personally performed and the decisions made by me.    All risks, benefits and alternatives were discussed with patient.  Patient is in agreement and understands the assessment and plan.  All questions were answered.  Sun Screen Education was given.   Return to Clinic in 3 months or sooner as needed.   Luly Betts PA-C   [facility]

## 2019-06-21 NOTE — LETTER
6/21/2019       RE: Lisa Fischer  [address]  [facility] [address]     Dear Colleague,    Thank you for referring your patient, Lisa Fischer, to the [facility] DERMATOLOGY at [facility]. Please see a copy of my visit note below.    [facility] Dermatology Note    Dermatology Problem List:  1. Verrucous vulgaris- otc salicylic acid   - s/p candida 1/29/19, 3/12/19, 5/1/19  - s/p cryo 1/18/18, 1/29/19  - s/p Efudex 5% cream  2. Acne vulgaris- Retin A micro 0.04% gel  3. Melasma, Retin A micro 0.04% gel and sun protection     CC:   Chief Complaint   Patient presents with     RECHECK     Lisa is here today for a 6 week follow up on warts. She states that they got better, but are still present.      Date of Service: Jun 21, 2019    History of Present Illness:  Ms. Lisa Fischer is a 33 year old female who presents today in follow up for warts. The patient was last seen in the dermatology clinic on 5/1/19 when 0.5 ml of candida antigen was injected into five warts on both hands and left foot. Today she reports that the warts have improved since the last visit, however are still present. She feels like capillaries in the warts appear more visible since starting the cimetidine. She would like to avoid having these injected again. She additionally reports some discoloration on her forehead. The patient is otherwise feeling well. There are no other skin concerns at this time      Past Medical History:   There is no problem list on file for this patient.    History reviewed. No pertinent past medical history.  History reviewed. No pertinent surgical history.     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    Family History:  No family history of skin cancer, eczema, or psoriasis.    Medications:  Current Outpatient  Medications   Medication Sig Dispense Refill     buPROPion (WELLBUTRIN XL) 150 MG 24 hr tablet Take 150 mg by mouth       cetirizine (ZYRTEC) 10 MG tablet Take 10 mg by mouth       cimetidine (TAGAMET) 800 MG tablet Take 1 tablet (800 mg) by mouth At Bedtime 30 tablet 3     levonorgestrel (MIRENA) 20 MCG/24HR IUD 1 each by Intrauterine route       tretinoin microsphere (RETIN-A MICRO PUMP) 0.04 % external gel Apply a peasized amount to the face at bedtime. 50 g 3     fluorouracil (EFUDEX) 5 % cream Apply topically At Bedtime To the wart (Patient not taking: Reported on 1/29/2019) 40 g 1     Allergies:  Allergies   Allergen Reactions     Dust Mite Extract      Other reaction(s): Other, see comments  Nasal congestion     Pollen Extract      Other reaction(s): RHINITIS  Other reaction(s): Other, see comments  Rhinitis     Review of Systems:  - Skin: As above in HPI. No additional skin concerns.  - Constitutional: She is feeling generally well, in her usual state of health    Physical exam:  Vitals: There were no vitals taken for this visit.  GEN: This is a well developed, well-nourished female in no acute distress, in a pleasant mood.    SKIN: Focused examination of the face and bilateral hands and feet was performed.  - On the left foot, there are 4 verrucous papules at the right 2nd metatarsal, thinner than previously  - 2 verrucous papules on the palmar surface of the left fourth digit and  left thumb beena ungually, thinner than previously  - 2 periungual warts on the right thumb, significantly thinner than previously   - 2 verrucous papules on the right index finger, significantly thinner than previously.  - Brown reticular symmetric patches on the forehead and bilateral cheeks.   - No other lesions of concern on areas examined.     Impression/Plan:  1. Verrucous vulgaris x 9, bilateral hands and feet, s/p cryotherapy, candida, efudex 5% cream       Continue cimetidine 800 mg at bedtime and zinc supplementation.      Continue daily paring of lesions along with otc salicylic acid treatments.     Pt will consider restarting the Efudex cream as she did not use this diligently in the past.      2. Melasma     Discussed importance of sun protection to avoid darkening.     Reviewed barrier sunscreens with zinc oxide or titanium dioxide.     Continue tretinoin 0.04% gel - apply to face at bedtime.     Follow-up in 2-3 months, earlier for new or changing lesions.      Staff Involved:  Staff Only    Scribe Disclosure:  I, Nando Jules, am serving as a scribe to document services personally performed by Luly Betts PA-C, based on data collection and the provider's statements to me.     Provider Disclosure:   The documentation recorded by the scribe accurately reflects the services I personally performed and the decisions made by me.    All risks, benefits and alternatives were discussed with patient.  Patient is in agreement and understands the assessment and plan.  All questions were answered.  Sun Screen Education was given.   Return to Clinic in 3 months or sooner as needed.   Luly Betts PA-C   [facility]          Again, thank you for allowing me to participate in the care of your patient.      Sincerely,    Luly Betts PA-C

## 2019-06-21 NOTE — LETTER
Date:June 25, 2019      Patient was self referred, no letter generated. Do not send.        [facility] Health Information

## 2020-03-10 ENCOUNTER — HEALTH MAINTENANCE LETTER (OUTPATIENT)
Age: 35
End: 2020-03-10

## 2020-06-24 ENCOUNTER — HOSPITAL ENCOUNTER (EMERGENCY)
Facility: CLINIC | Age: 35
Discharge: HOME | End: 2020-06-24
Attending: EMERGENCY MEDICINE | Admitting: EMERGENCY MEDICINE
Payer: COMMERCIAL

## 2020-06-24 ENCOUNTER — APPOINTMENT (OUTPATIENT)
Dept: CT IMAGING | Facility: CLINIC | Age: 35
End: 2020-06-24
Attending: EMERGENCY MEDICINE
Payer: COMMERCIAL

## 2020-06-24 VITALS
OXYGEN SATURATION: 94 % | HEIGHT: 62 IN | DIASTOLIC BLOOD PRESSURE: 72 MMHG | HEART RATE: 54 BPM | SYSTOLIC BLOOD PRESSURE: 110 MMHG | RESPIRATION RATE: 16 BRPM | TEMPERATURE: 98.4 F

## 2020-06-24 DIAGNOSIS — R10.31 ABDOMINAL PAIN, RIGHT LOWER QUADRANT: ICD-10-CM

## 2020-06-24 LAB
ALBUMIN SERPL-MCNC: 4.5 G/DL (ref 3.4–5)
ALBUMIN UR-MCNC: NEGATIVE MG/DL
ALP SERPL-CCNC: 55 U/L (ref 40–150)
ALT SERPL W P-5'-P-CCNC: 17 U/L (ref 0–50)
ANION GAP SERPL CALCULATED.3IONS-SCNC: 3 MMOL/L (ref 3–14)
APPEARANCE UR: CLEAR
AST SERPL W P-5'-P-CCNC: 17 U/L (ref 0–45)
BASOPHILS # BLD AUTO: 0 10E9/L (ref 0–0.2)
BASOPHILS NFR BLD AUTO: 0.3 %
BILIRUB SERPL-MCNC: 0.6 MG/DL (ref 0.2–1.3)
BILIRUB UR QL STRIP: NEGATIVE
BUN SERPL-MCNC: 9 MG/DL (ref 7–30)
CALCIUM SERPL-MCNC: 9.3 MG/DL (ref 8.5–10.1)
CHLORIDE SERPL-SCNC: 106 MMOL/L (ref 94–109)
CO2 SERPL-SCNC: 31 MMOL/L (ref 20–32)
COLOR UR AUTO: YELLOW
CREAT SERPL-MCNC: 0.72 MG/DL (ref 0.52–1.04)
DIFFERENTIAL METHOD BLD: NORMAL
EOSINOPHIL # BLD AUTO: 0.2 10E9/L (ref 0–0.7)
EOSINOPHIL NFR BLD AUTO: 2.3 %
ERYTHROCYTE [DISTWIDTH] IN BLOOD BY AUTOMATED COUNT: 10.9 % (ref 10–15)
GFR SERPL CREATININE-BSD FRML MDRD: >90 ML/MIN/{1.73_M2}
GLUCOSE SERPL-MCNC: 91 MG/DL (ref 70–99)
GLUCOSE UR STRIP-MCNC: NEGATIVE MG/DL
HCG UR QL: NEGATIVE
HCT VFR BLD AUTO: 42.6 % (ref 35–47)
HGB BLD-MCNC: 14.2 G/DL (ref 11.7–15.7)
HGB UR QL STRIP: ABNORMAL
IMM GRANULOCYTES # BLD: 0 10E9/L (ref 0–0.4)
IMM GRANULOCYTES NFR BLD: 0.1 %
KETONES UR STRIP-MCNC: NEGATIVE MG/DL
LEUKOCYTE ESTERASE UR QL STRIP: NEGATIVE
LIPASE SERPL-CCNC: 87 U/L (ref 73–393)
LYMPHOCYTES # BLD AUTO: 3.4 10E9/L (ref 0.8–5.3)
LYMPHOCYTES NFR BLD AUTO: 36.3 %
MCH RBC QN AUTO: 32.1 PG (ref 26.5–33)
MCHC RBC AUTO-ENTMCNC: 33.3 G/DL (ref 31.5–36.5)
MCV RBC AUTO: 96 FL (ref 78–100)
MONOCYTES # BLD AUTO: 0.6 10E9/L (ref 0–1.3)
MONOCYTES NFR BLD AUTO: 6.8 %
MUCOUS THREADS #/AREA URNS LPF: PRESENT /LPF
NEUTROPHILS # BLD AUTO: 5 10E9/L (ref 1.6–8.3)
NEUTROPHILS NFR BLD AUTO: 54.2 %
NITRATE UR QL: NEGATIVE
NRBC # BLD AUTO: 0 10*3/UL
NRBC BLD AUTO-RTO: 0 /100
PH UR STRIP: 6.5 PH (ref 5–7)
PLATELET # BLD AUTO: 328 10E9/L (ref 150–450)
POTASSIUM SERPL-SCNC: 3.8 MMOL/L (ref 3.4–5.3)
PROT SERPL-MCNC: 8.2 G/DL (ref 6.8–8.8)
RBC # BLD AUTO: 4.42 10E12/L (ref 3.8–5.2)
RBC #/AREA URNS AUTO: 3 /HPF (ref 0–2)
SODIUM SERPL-SCNC: 140 MMOL/L (ref 133–144)
SOURCE: ABNORMAL
SP GR UR STRIP: 1.02 (ref 1–1.03)
SQUAMOUS #/AREA URNS AUTO: 2 /HPF (ref 0–1)
UROBILINOGEN UR STRIP-MCNC: NORMAL MG/DL (ref 0–2)
WBC # BLD AUTO: 9.3 10E9/L (ref 4–11)
WBC #/AREA URNS AUTO: 1 /HPF (ref 0–5)

## 2020-06-24 PROCEDURE — 83690 ASSAY OF LIPASE: CPT | Performed by: EMERGENCY MEDICINE

## 2020-06-24 PROCEDURE — 80053 COMPREHEN METABOLIC PANEL: CPT | Performed by: EMERGENCY MEDICINE

## 2020-06-24 PROCEDURE — 81001 URINALYSIS AUTO W/SCOPE: CPT | Performed by: EMERGENCY MEDICINE

## 2020-06-24 PROCEDURE — 99284 EMERGENCY DEPT VISIT MOD MDM: CPT | Mod: Z6 | Performed by: EMERGENCY MEDICINE

## 2020-06-24 PROCEDURE — 85025 COMPLETE CBC W/AUTO DIFF WBC: CPT | Performed by: EMERGENCY MEDICINE

## 2020-06-24 PROCEDURE — 81025 URINE PREGNANCY TEST: CPT | Performed by: EMERGENCY MEDICINE

## 2020-06-24 PROCEDURE — 99284 EMERGENCY DEPT VISIT MOD MDM: CPT | Mod: 25 | Performed by: EMERGENCY MEDICINE

## 2020-06-24 PROCEDURE — 74177 CT ABD & PELVIS W/CONTRAST: CPT

## 2020-06-24 PROCEDURE — 25000128 H RX IP 250 OP 636: Performed by: EMERGENCY MEDICINE

## 2020-06-24 RX ORDER — IOPAMIDOL 755 MG/ML
85 INJECTION, SOLUTION INTRAVASCULAR ONCE
Status: COMPLETED | OUTPATIENT
Start: 2020-06-24 | End: 2020-06-24

## 2020-06-24 RX ADMIN — IOPAMIDOL 85 ML: 755 INJECTION, SOLUTION INTRAVENOUS at 19:56

## 2020-06-24 NOTE — ED AVS SNAPSHOT
[facility], [place], Emergency Department  [address] [place] [id number]  Phone:  [phone]                                    Lisa Fischer   MRN: [id number]    Department:  [facility], Emergency Department   Date of Visit:  6/24/2020           After Visit Summary Signature Page    I have received my discharge instructions, and my questions have been answered. I have discussed any challenges I see with this plan with the nurse or doctor.    ..........................................................................................................................................  Patient/Patient Representative Signature      ..........................................................................................................................................  Patient Representative Print Name and Relationship to Patient    ..................................................               ................................................  Date                                   Time    ..........................................................................................................................................  Reviewed by Signature/Title    ...................................................              ..............................................  Date                                               Time          22EPIC Rev 08/18

## 2020-06-24 NOTE — ED TRIAGE NOTES
Pt c/o RLQ abd pain and diarrhea. States that she thinks she had IBS. Had 3 episodes of diarrhea today.

## 2020-06-24 NOTE — ED PROVIDER NOTES
ED Provider Note  [facility] EMERGENCY DEPARTMENT ([facility])  June 24, 2020  History     Chief Complaint   Patient presents with     Abdominal Pain     Diarrhea     HPI  Lisa Fischer is a 34 year old female who presents to the Emergency Department for evaluation of right lower quadrant abdominal pain. Patient complains of right lower quadrant abdominal pain which began 24 hours ago that she describes as similar to a side stitch and a moderate 3 out of 10.  She states that the pain varies in severity but that it has been constant the past 24 hours, and that it does not worsen when eating or moving.  She notes some accompanying fatigue. She also notes stress induced diarrhea for the past 3 months.  Patient states that she tends to have mild IBS-like symptoms when under stress but has never been diagnosed. She states her diarrhea has neither increased nor decreased since the abdominal pain started. Patient denies any past abdominal surgery or kidney stones.  She denies any urinary problems or hematuria, any back pain, or any fevers.  Patient denies recently consuming any food out of the ordinary.  Patient reports she has an IUD in place. No there symptoms noted.       Past Medical History  History reviewed. No pertinent past medical history.  History reviewed. No pertinent surgical history.  buPROPion (WELLBUTRIN XL) 150 MG 24 hr tablet  cetirizine (ZYRTEC) 10 MG tablet  cimetidine (TAGAMET) 800 MG tablet  fluorouracil (EFUDEX) 5 % cream  levonorgestrel (MIRENA) 20 MCG/24HR IUD  tretinoin microsphere (RETIN-A MICRO PUMP) 0.04 % external gel      Allergies   Allergen Reactions     Dust Mite Extract      Other reaction(s): Other, see comments  Nasal congestion     Pollen Extract      Other reaction(s): RHINITIS  Other reaction(s): Other, see comments  Rhinitis     Past medical history, past surgical history, medications, and allergies were reviewed with the  "patient. Additional pertinent items: None    Family History  History reviewed. No pertinent family history.  Family history was reviewed with the patient. Additional pertinent items: None    Social History  Social History     Tobacco Use     Smoking status: Never Smoker     Smokeless tobacco: Never Used   Substance Use Topics     Alcohol use: Yes     Comment: occasionally, 3 drinks/week average     Drug use: Never      Social history was reviewed with the patient. Additional pertinent items: None    A complete review of systems was performed with pertinent positives and negatives noted in the HPI, and all other systems negative.    Physical Exam   BP: (!) 141/95  Pulse: 60  Heart Rate: 77  Temp: 98.1  F (36.7  C)  Resp: 16  Height: 157.5 cm (5' 2\")  SpO2: 100 %  Physical Exam  Constitutional:       General: She is not in acute distress.     Appearance: She is well-developed. She is not diaphoretic.   HENT:      Head: Normocephalic and atraumatic.      Mouth/Throat:      Pharynx: No oropharyngeal exudate.   Eyes:      General: No scleral icterus.        Right eye: No discharge.         Left eye: No discharge.      Pupils: Pupils are equal, round, and reactive to light.   Neck:      Musculoskeletal: Normal range of motion and neck supple.   Cardiovascular:      Rate and Rhythm: Normal rate and regular rhythm.      Heart sounds: Normal heart sounds. No murmur. No friction rub. No gallop.    Pulmonary:      Effort: Pulmonary effort is normal. No respiratory distress.      Breath sounds: Normal breath sounds. No wheezing.   Chest:      Chest wall: No tenderness.   Abdominal:      General: Bowel sounds are normal. There is no distension.      Palpations: Abdomen is soft.      Tenderness: There is abdominal tenderness in the right lower quadrant. There is no right CVA tenderness or left CVA tenderness.   Musculoskeletal: Normal range of motion.         General: No tenderness or deformity.   Skin:     General: Skin is warm " and dry.      Coloration: Skin is not pale.      Findings: No erythema or rash.   Neurological:      Mental Status: She is alert and oriented to person, place, and time.      Cranial Nerves: No cranial nerve deficit.         ED Course      Procedures                         No results found for any visits on 06/24/20.  Medications - No data to display     Assessments & Plan (with Medical Decision Making)   This is a 34-year-old female who presents with 1 day of right lower quadrant abdominal pain.  No previous similar occurrence in the past.  She also notes fatigue with this.  On exam she has right lower quadrant tenderness.  Lab work shows no acute abnormalities.  CT abdomen pelvis shows no acute abnormalities.  I discussed all results with patient.  Discussed that at this point do not know the cause of the pain.  Discussed the possibility of early appendicitis and reasons to return to the emergency department. Will discharge home with return precautions. Discussed reasons to return to the emergency department.  Patient understands and agrees with this plan.    I have reviewed the nursing notes. I have reviewed the findings, diagnosis, plan and need for follow up with the patient.    New Prescriptions    No medications on file       Final diagnoses:   None       --  Carina DUKES, am serving as a trained medical scribe to document services personally performed by Sean Velasquez DO, based on the provider's statements to me.   Sean DUKES DO, was physically present and have reviewed and verified the accuracy of this note documented by Carina Camarillo.     Sean Velasquez DO  06/25/20 0104

## 2020-12-27 ENCOUNTER — HEALTH MAINTENANCE LETTER (OUTPATIENT)
Age: 35
End: 2020-12-27

## 2021-04-24 ENCOUNTER — HEALTH MAINTENANCE LETTER (OUTPATIENT)
Age: 36
End: 2021-04-24

## 2021-10-04 ENCOUNTER — HEALTH MAINTENANCE LETTER (OUTPATIENT)
Age: 36
End: 2021-10-04

## 2022-05-15 ENCOUNTER — HEALTH MAINTENANCE LETTER (OUTPATIENT)
Age: 37
End: 2022-05-15

## 2022-09-11 ENCOUNTER — HEALTH MAINTENANCE LETTER (OUTPATIENT)
Age: 37
End: 2022-09-11

## 2023-06-03 ENCOUNTER — HEALTH MAINTENANCE LETTER (OUTPATIENT)
Age: 38
End: 2023-06-03

## (undated) RX ORDER — CANDIDA ALBICANS 1000 [PNU]/ML
INJECTION, SOLUTION INTRADERMAL
Status: DISPENSED
Start: 2019-05-01

## (undated) RX ORDER — CANDIDA ALBICANS 1000 [PNU]/ML
INJECTION, SOLUTION INTRADERMAL
Status: DISPENSED
Start: 2019-01-29

## (undated) RX ORDER — CANDIDA ALBICANS 1000 [PNU]/ML
INJECTION, SOLUTION INTRADERMAL
Status: DISPENSED
Start: 2019-03-12